# Patient Record
Sex: MALE | Race: WHITE | Employment: PART TIME | URBAN - NONMETROPOLITAN AREA
[De-identification: names, ages, dates, MRNs, and addresses within clinical notes are randomized per-mention and may not be internally consistent; named-entity substitution may affect disease eponyms.]

---

## 2018-01-10 ENCOUNTER — HOSPITAL ENCOUNTER (EMERGENCY)
Age: 23
Discharge: HOME | End: 2018-01-10
Payer: COMMERCIAL

## 2018-01-10 VITALS
TEMPERATURE: 98.5 F | DIASTOLIC BLOOD PRESSURE: 88 MMHG | HEART RATE: 96 BPM | SYSTOLIC BLOOD PRESSURE: 153 MMHG | WEIGHT: 230 LBS | OXYGEN SATURATION: 99 % | RESPIRATION RATE: 20 BRPM | BODY MASS INDEX: 29.52 KG/M2 | HEIGHT: 74 IN

## 2018-01-10 DIAGNOSIS — A54.9 GONORRHEA IN MALE: Primary | ICD-10-CM

## 2018-01-10 LAB
BACTERIA: ABNORMAL /HPF
BILIRUBIN URINE: NEGATIVE
BLOOD, URINE: NEGATIVE
CASTS 2: ABNORMAL /LPF
CASTS UA: ABNORMAL /LPF
CHARACTER, URINE: ABNORMAL
CHLAMYDIA TRACHOMATIS BY RT-PCR: NOT DETECTED
COLOR: YELLOW
CRYSTALS, UA: ABNORMAL
CT/NG SOURCE: ABNORMAL
EPITHELIAL CELLS, UA: ABNORMAL /HPF
GLUCOSE URINE: NEGATIVE MG/DL
KETONES, URINE: NEGATIVE
LEUKOCYTE ESTERASE, URINE: ABNORMAL
MISCELLANEOUS 2: ABNORMAL
NEISSERIA GONORRHOEAE BY RT-PCR: DETECTED
NITRITE, URINE: NEGATIVE
PH UA: 8
PROTEIN UA: NEGATIVE
RBC URINE: ABNORMAL /HPF
RENAL EPITHELIAL, UA: ABNORMAL
SPECIFIC GRAVITY, URINE: 1.01 (ref 1–1.03)
UROBILINOGEN, URINE: 0.2 EU/DL
WBC UA: ABNORMAL /HPF
YEAST: ABNORMAL

## 2018-01-10 PROCEDURE — 87086 URINE CULTURE/COLONY COUNT: CPT

## 2018-01-10 PROCEDURE — 6370000000 HC RX 637 (ALT 250 FOR IP): Performed by: NURSE PRACTITIONER

## 2018-01-10 PROCEDURE — 6360000002 HC RX W HCPCS: Performed by: NURSE PRACTITIONER

## 2018-01-10 PROCEDURE — 99283 EMERGENCY DEPT VISIT LOW MDM: CPT

## 2018-01-10 PROCEDURE — 96372 THER/PROPH/DIAG INJ SC/IM: CPT

## 2018-01-10 PROCEDURE — 87491 CHLMYD TRACH DNA AMP PROBE: CPT

## 2018-01-10 PROCEDURE — 81001 URINALYSIS AUTO W/SCOPE: CPT

## 2018-01-10 PROCEDURE — 87591 N.GONORRHOEAE DNA AMP PROB: CPT

## 2018-01-10 RX ORDER — CEFTRIAXONE SODIUM 250 MG/1
250 INJECTION, POWDER, FOR SOLUTION INTRAMUSCULAR; INTRAVENOUS ONCE
Status: COMPLETED | OUTPATIENT
Start: 2018-01-10 | End: 2018-01-10

## 2018-01-10 RX ORDER — AZITHROMYCIN 250 MG/1
1000 TABLET, FILM COATED ORAL ONCE
Status: COMPLETED | OUTPATIENT
Start: 2018-01-10 | End: 2018-01-10

## 2018-01-10 RX ADMIN — CEFTRIAXONE SODIUM 250 MG: 250 INJECTION, POWDER, FOR SOLUTION INTRAMUSCULAR; INTRAVENOUS at 17:10

## 2018-01-10 RX ADMIN — AZITHROMYCIN 1000 MG: 250 TABLET, FILM COATED ORAL at 17:10

## 2018-01-10 ASSESSMENT — ENCOUNTER SYMPTOMS
DIARRHEA: 0
EYE DISCHARGE: 0
RHINORRHEA: 0
NAUSEA: 0
SHORTNESS OF BREATH: 0
WHEEZING: 0
EYE REDNESS: 0
SORE THROAT: 0
BACK PAIN: 0
VOMITING: 0
COUGH: 0
ABDOMINAL PAIN: 0

## 2018-01-10 NOTE — LETTER
[facility] EMERG[facility]  [address] [facility]  [place] [address]  Phone: [phone]             January 10, 2018    Patient: Cecilia Martinez   YOB: 1995   Date of Visit: 1/10/2018       To Whom It May Concern:    Saeid Zhao was seen and treated in our emergency department on 1/10/2018. He may return to school on 1/11/18. Sincerely,   NOELLE Mullins RN            Signature:__________________________________

## 2018-01-10 NOTE — ED PROVIDER NOTES
treated in the emergency department for this and certainly follow-up with the [address] [place] [address] within a week. He is also instructed to remain abstinent until a confirmatory negative test can be completed. Patient will be discharged in stable condition and is amenable to this plan. History to return for any new or worsening symptoms. CRITICAL CARE:   none    CONSULTS:  none    PROCEDURES:  none    FINAL IMPRESSION      1. Gonorrhea in male          DISPOSITION/PLAN   The patient was discharge. PATIENT REFERRED TO:  [facility] Department  [address] [place] [address]  [phone]    Go in 1 week        DISCHARGE MEDICATIONS:  New Prescriptions    No medications on file       (Please note that portions of this note were completed with a voice recognition program.  Efforts were made to edit the dictations but occasionally words are mis-transcribed.)    Scribe:  Sumeet Alfred 1/10/18 2:36 PM Scribing for and in the presence of Justino Guadalupe CNP. Signed by: Wilfred Guerrier, 1/10/18 5:11 PM    Provider:  I personally performed the services described in the documentation, reviewed and edited the documentation which was dictated to the scribe in my presence, and it accurately records my words and actions.     Justino Guadalupe CNP 01/10/18 5:11 PM    KIT Sher APRN  01/10/18 [id number]

## 2018-01-11 ENCOUNTER — TELEPHONE (OUTPATIENT)
Dept: PHARMACY | Age: 23
End: 2018-01-11

## 2018-01-12 LAB — URINE CULTURE REFLEX: NORMAL
